# Patient Record
Sex: FEMALE | Race: WHITE | NOT HISPANIC OR LATINO | URBAN - METROPOLITAN AREA
[De-identification: names, ages, dates, MRNs, and addresses within clinical notes are randomized per-mention and may not be internally consistent; named-entity substitution may affect disease eponyms.]

---

## 2018-07-31 ENCOUNTER — OUTPATIENT (OUTPATIENT)
Dept: OUTPATIENT SERVICES | Facility: HOSPITAL | Age: 23
LOS: 1 days | End: 2018-07-31
Payer: COMMERCIAL

## 2018-07-31 VITALS
RESPIRATION RATE: 18 BRPM | OXYGEN SATURATION: 98 % | WEIGHT: 145.95 LBS | HEIGHT: 59 IN | SYSTOLIC BLOOD PRESSURE: 138 MMHG | DIASTOLIC BLOOD PRESSURE: 70 MMHG | TEMPERATURE: 98 F | HEART RATE: 70 BPM

## 2018-07-31 DIAGNOSIS — K64.9 UNSPECIFIED HEMORRHOIDS: ICD-10-CM

## 2018-07-31 DIAGNOSIS — Z01.818 ENCOUNTER FOR OTHER PREPROCEDURAL EXAMINATION: ICD-10-CM

## 2018-07-31 DIAGNOSIS — Z96.22 MYRINGOTOMY TUBE(S) STATUS: Chronic | ICD-10-CM

## 2018-07-31 DIAGNOSIS — K64.8 OTHER HEMORRHOIDS: ICD-10-CM

## 2018-07-31 LAB
HCT VFR BLD CALC: 41.5 % — SIGNIFICANT CHANGE UP (ref 34.5–45)
HGB BLD-MCNC: 14.2 G/DL — SIGNIFICANT CHANGE UP (ref 11.5–15.5)
MCHC RBC-ENTMCNC: 29.8 PG — SIGNIFICANT CHANGE UP (ref 27–34)
MCHC RBC-ENTMCNC: 34.2 GM/DL — SIGNIFICANT CHANGE UP (ref 32–36)
MCV RBC AUTO: 87.2 FL — SIGNIFICANT CHANGE UP (ref 80–100)
PLATELET # BLD AUTO: 233 K/UL — SIGNIFICANT CHANGE UP (ref 150–400)
RBC # BLD: 4.76 M/UL — SIGNIFICANT CHANGE UP (ref 3.8–5.2)
RBC # FLD: 12.4 % — SIGNIFICANT CHANGE UP (ref 10.3–14.5)
WBC # BLD: 7.18 K/UL — SIGNIFICANT CHANGE UP (ref 3.8–10.5)
WBC # FLD AUTO: 7.18 K/UL — SIGNIFICANT CHANGE UP (ref 3.8–10.5)

## 2018-07-31 PROCEDURE — 87389 HIV-1 AG W/HIV-1&-2 AB AG IA: CPT

## 2018-07-31 PROCEDURE — G0463: CPT

## 2018-07-31 PROCEDURE — 85027 COMPLETE CBC AUTOMATED: CPT

## 2018-07-31 RX ORDER — SODIUM CHLORIDE 9 MG/ML
3 INJECTION INTRAMUSCULAR; INTRAVENOUS; SUBCUTANEOUS EVERY 8 HOURS
Qty: 0 | Refills: 0 | Status: DISCONTINUED | OUTPATIENT
Start: 2018-08-06 | End: 2018-08-21

## 2018-07-31 RX ORDER — LIDOCAINE HCL 20 MG/ML
0.2 VIAL (ML) INJECTION ONCE
Qty: 0 | Refills: 0 | Status: DISCONTINUED | OUTPATIENT
Start: 2018-08-06 | End: 2018-08-21

## 2018-07-31 NOTE — H&P PST ADULT - PMH
PCOS (polycystic ovarian syndrome) Anemia  iron infusion March 2018  Asthma, exercise induced  last inhaler use approximately 3 years ago  Hemorrhoids  s/p banding procedure; unsuccessful  PCOS (polycystic ovarian syndrome)

## 2018-07-31 NOTE — H&P PST ADULT - NSANTHOSAYNRD_GEN_A_CORE
No. ABDULAZIZ screening performed.  STOP BANG Legend: 0-2 = LOW Risk; 3-4 = INTERMEDIATE Risk; 5-8 = HIGH Risk

## 2018-07-31 NOTE — H&P PST ADULT - NEUROLOGICAL DETAILS
alert and oriented x 3/normal strength/responds to pain/sensation intact/responds to verbal commands

## 2018-07-31 NOTE — H&P PST ADULT - HISTORY OF PRESENT ILLNESS
23 year old female reports a history of intermittent hemorrhoids X 7 years. Patient states she experiences rectal bleeding approximately 4X weekly and has required iron infusions in the past due to anemia.

## 2018-08-01 LAB — HIV 1+2 AB+HIV1 P24 AG SERPL QL IA: SIGNIFICANT CHANGE UP

## 2018-08-06 ENCOUNTER — OUTPATIENT (OUTPATIENT)
Dept: OUTPATIENT SERVICES | Facility: HOSPITAL | Age: 23
LOS: 1 days | End: 2018-08-06
Payer: COMMERCIAL

## 2018-08-06 VITALS
HEART RATE: 67 BPM | DIASTOLIC BLOOD PRESSURE: 79 MMHG | TEMPERATURE: 98 F | OXYGEN SATURATION: 99 % | HEIGHT: 59 IN | SYSTOLIC BLOOD PRESSURE: 114 MMHG | RESPIRATION RATE: 16 BRPM | WEIGHT: 145.95 LBS

## 2018-08-06 VITALS
DIASTOLIC BLOOD PRESSURE: 78 MMHG | RESPIRATION RATE: 16 BRPM | SYSTOLIC BLOOD PRESSURE: 122 MMHG | OXYGEN SATURATION: 99 % | HEART RATE: 70 BPM | TEMPERATURE: 97 F

## 2018-08-06 DIAGNOSIS — Z01.818 ENCOUNTER FOR OTHER PREPROCEDURAL EXAMINATION: ICD-10-CM

## 2018-08-06 DIAGNOSIS — Z96.22 MYRINGOTOMY TUBE(S) STATUS: Chronic | ICD-10-CM

## 2018-08-06 DIAGNOSIS — K64.8 OTHER HEMORRHOIDS: ICD-10-CM

## 2018-08-06 PROCEDURE — 88304 TISSUE EXAM BY PATHOLOGIST: CPT

## 2018-08-06 PROCEDURE — 46260 REMOVE IN/EX HEM GROUPS 2+: CPT

## 2018-08-06 PROCEDURE — 88304 TISSUE EXAM BY PATHOLOGIST: CPT | Mod: 26

## 2018-08-06 RX ORDER — METFORMIN HYDROCHLORIDE 850 MG/1
1 TABLET ORAL
Qty: 0 | Refills: 0 | COMMUNITY

## 2018-08-06 RX ORDER — ONDANSETRON 8 MG/1
4 TABLET, FILM COATED ORAL ONCE
Qty: 0 | Refills: 0 | Status: DISCONTINUED | OUTPATIENT
Start: 2018-08-06 | End: 2018-08-21

## 2018-08-06 RX ORDER — IBUPROFEN 200 MG
1 TABLET ORAL
Qty: 40 | Refills: 0 | OUTPATIENT
Start: 2018-08-06

## 2018-08-06 RX ORDER — CELECOXIB 200 MG/1
200 CAPSULE ORAL ONCE
Qty: 0 | Refills: 0 | Status: COMPLETED | OUTPATIENT
Start: 2018-08-06 | End: 2018-08-06

## 2018-08-06 RX ORDER — CELECOXIB 200 MG/1
200 CAPSULE ORAL ONCE
Qty: 0 | Refills: 0 | Status: DISCONTINUED | OUTPATIENT
Start: 2018-08-06 | End: 2018-08-21

## 2018-08-06 RX ORDER — ACETAMINOPHEN 500 MG
1000 TABLET ORAL ONCE
Qty: 0 | Refills: 0 | Status: COMPLETED | OUTPATIENT
Start: 2018-08-06 | End: 2018-08-06

## 2018-08-06 RX ORDER — ACETAMINOPHEN WITH CODEINE 300MG-30MG
1 TABLET ORAL
Qty: 28 | Refills: 0 | OUTPATIENT
Start: 2018-08-06 | End: 2018-08-12

## 2018-08-06 RX ORDER — OXYCODONE HYDROCHLORIDE 5 MG/1
5 TABLET ORAL ONCE
Qty: 0 | Refills: 0 | Status: DISCONTINUED | OUTPATIENT
Start: 2018-08-06 | End: 2018-08-06

## 2018-08-06 RX ORDER — SODIUM CHLORIDE 9 MG/ML
1000 INJECTION, SOLUTION INTRAVENOUS
Qty: 0 | Refills: 0 | Status: DISCONTINUED | OUTPATIENT
Start: 2018-08-06 | End: 2018-08-21

## 2018-08-06 RX ADMIN — Medication 1000 MILLIGRAM(S): at 06:18

## 2018-08-06 RX ADMIN — CELECOXIB 200 MILLIGRAM(S): 200 CAPSULE ORAL at 06:18

## 2018-08-06 NOTE — ASU DISCHARGE PLAN (ADULT/PEDIATRIC). - NOTIFY
Numbness, color, or temperature change to extremity/Bleeding that does not stop/Excessive Diarrhea/Swelling that continues/Inability to Tolerate Liquids or Foods/Unable to Urinate/Increased Irritability or Sluggishness/Fever greater than 101

## 2018-08-06 NOTE — ASU DISCHARGE PLAN (ADULT/PEDIATRIC). - MEDICATION SUMMARY - MEDICATIONS TO TAKE
I will START or STAY ON the medications listed below when I get home from the hospital:    ibuprofen 800 mg oral tablet  -- 1 tab(s) by mouth every 8 hours, As Needed -for moderate pain MDD:3 tablets  -- Do not take this drug if you are pregnant.  It is very important that you take or use this exactly as directed.  Do not skip doses or discontinue unless directed by your doctor.  May cause drowsiness or dizziness.  Obtain medical advice before taking any non-prescription drugs as some may affect the action of this medication.  Take with food or milk.    -- Indication: For Other hemorrhoids    acetaminophen-codeine 300 mg-30 mg oral tablet  -- 1 tab(s) by mouth every 6 hours, As Needed -for severe pain MDD:4 tablets  -- Caution federal law prohibits the transfer of this drug to any person other  than the person for whom it was prescribed.  May cause drowsiness.  Alcohol may intensify this effect.  Use care when operating dangerous machinery.  This product contains acetaminophen.  Do not use  with any other product containing acetaminophen to prevent possible liver damage.  Using more of this medication than prescribed may cause serious breathing problems.    -- Indication: For Other hemorrhoids    metFORMIN 500 mg oral tablet  -- 1 tab(s) by mouth 2 times a day  -- Indication: For Home med

## 2018-08-06 NOTE — BRIEF OPERATIVE NOTE - POST-OP DX
External hemorrhoids  08/06/2018    Active  Kermit Ramon  Grade III hemorrhoids  08/06/2018    Active  Kermit Ramon

## 2018-08-06 NOTE — ASU PATIENT PROFILE, ADULT - PMH
Anemia  iron infusion March 2018  Asthma, exercise induced  last inhaler use approximately 3 years ago  Hemorrhoids  s/p banding procedure; unsuccessful  PCOS (polycystic ovarian syndrome)

## 2018-08-09 LAB — SURGICAL PATHOLOGY STUDY: SIGNIFICANT CHANGE UP

## 2021-03-18 NOTE — ASU PREOP CHECKLIST - NS PREOP CHK HIBICLENS NA
Yamel is a 73 year old who is being evaluated via a billable telephone visit.      What phone number would you like to be contacted at? [phone]  How would you like to obtain your AVS? Mail a copy    Assessment & Plan     Muscle spasm  - tiZANidine (ZANAFLEX) 2 MG tablet; Take 1 tablet (2 mg) by mouth 3 times daily as needed for muscle spasms      FUTURE APPOINTMENTS:       - Follow-up visit in one month or sooner as needed.    Return in about 4 weeks (around 4/15/2021) for Follow up.    Catie Fraser MD  [facility]    Subjective   Yamel is a 73 year old who presents for the following health issues     HPI   73 yr old female with generalized pain- uses Tizanidine as needed. Patient warned of sedating effect. She is already on a lot of potential sedating mediations. She reports no side effects.   Medication Followup of Discuss use of tizanadine--taking for Arthritic pain and  Tension in neck and back    Taking Medication as prescribed: NO, was told to call before further refills    Side Effects:  None    Medication Helping Symptoms:  yes         Review of Systems   Constitutional, HEENT, cardiovascular, pulmonary, gi and gu systems are negative, except as otherwise noted.      Objective           Vitals:  No vitals were obtained today due to virtual visit.    Physical Exam   healthy, alert and no distress  PSYCH: Alert and oriented times 3; coherent speech, normal   rate and volume, able to articulate logical thoughts, able   to abstract reason, no tangential thoughts, no hallucinations   or delusions  Her affect is normal  RESP: No cough, no audible wheezing, able to talk in full sentences  Remainder of exam unable to be completed due to telephone visits                Phone call duration: 3 minutes   N/A

## 2021-04-20 NOTE — H&P PST ADULT - GASTROINTESTINAL
[FreeTextEntry1] : NAD\par A&Ox3\par Non-obese\par No significant change in today's examination\par Inspection: decreased lumbar lordosis\par ROM L-spine: 1/2 full forward flexion; 10' passive extension\par ROM Hips: smooth IR/ER w/o pain\par Pelvic tilt: slight\par Seated slump test: neg\par SLR: neg\par SHAUNA's: neg\par DTR's: symmetric knees; absent ankles\par MMT: 5/5 b/l LE\par Sensation: SILT\par Toe & Heel Walk: Yes\par Palpation: no TPs\par  details… detailed exam

## 2022-11-04 NOTE — ASU PATIENT PROFILE, ADULT - IS PATIENT PREGNANT?
[FreeTextEntry1] : Hypertension, hypothyroid, and lung nodule [de-identified] : no complaints\par  ucg negative/no

## 2025-04-03 ENCOUNTER — OFFICE (OUTPATIENT)
Facility: LOCATION | Age: 30
Setting detail: OPHTHALMOLOGY
End: 2025-04-03
Payer: COMMERCIAL

## 2025-04-03 DIAGNOSIS — H52.13: ICD-10-CM

## 2025-04-03 PROCEDURE — SCREF LASIK EVAL: Performed by: OPHTHALMOLOGY

## 2025-04-03 ASSESSMENT — REFRACTION_CURRENTRX
OS_VPRISM_DIRECTION: SV
OS_CYLINDER: SPHERE
OS_OVR_VA: 20/
OD_OVR_VA: 20/
OD_VPRISM_DIRECTION: SV
OD_CYLINDER: SPHERE
OD_SPHERE: -6.25
OS_SPHERE: -6.00

## 2025-04-03 ASSESSMENT — KERATOMETRY
OD_AXISANGLE_DEGREES: 089
OS_AXISANGLE_DEGREES: 079
OS_K1POWER_DIOPTERS: 43.75
OD_K1POWER_DIOPTERS: 43.75
OD_K2POWER_DIOPTERS: 44.00
OS_K2POWER_DIOPTERS: 44.50

## 2025-04-03 ASSESSMENT — CONFRONTATIONAL VISUAL FIELD TEST (CVF)
OD_FINDINGS: FULL
OS_FINDINGS: FULL

## 2025-04-03 ASSESSMENT — REFRACTION_AUTOREFRACTION
OD_SPHERE: -5.25
OD_AXIS: 105
OS_SPHERE: -5.25
OD_CYLINDER: -0.50
OS_CYLINDER: SPHERE

## 2025-04-03 ASSESSMENT — VISUAL ACUITY
OS_BCVA: 20/20-
OD_BCVA: 20/20

## 2025-04-10 ENCOUNTER — OFFICE (OUTPATIENT)
Facility: LOCATION | Age: 30
Setting detail: OPHTHALMOLOGY
End: 2025-04-10
Payer: COMMERCIAL

## 2025-04-10 DIAGNOSIS — H52.13: ICD-10-CM

## 2025-04-10 PROCEDURE — SCREF LASIK EVAL: Performed by: OPHTHALMOLOGY

## 2025-04-10 ASSESSMENT — REFRACTION_MANIFEST
OS_VA1: 20/15
OS_SPHERE: -6.25
OS_CYLINDER: SPH
OD_SPHERE: -6.25
OD_VA1: 20/15
OD_AXIS: 090
OD_CYLINDER: -0.50

## 2025-04-10 ASSESSMENT — REFRACTION_CURRENTRX
OS_SPHERE: -6.00
OS_VPRISM_DIRECTION: SV
OD_OVR_VA: 20/
OD_CYLINDER: SPHERE
OD_SPHERE: -6.25
OS_CYLINDER: SPHERE
OS_OVR_VA: 20/
OD_VPRISM_DIRECTION: SV

## 2025-04-10 ASSESSMENT — KERATOMETRY
OS_K1K2_AVERAGE: 44.125
OD_K2POWER_DIOPTERS: 44.00
OS_K1POWER_DIOPTERS: 43.75
OS_K2POWER_DIOPTERS: 44.50
OD_K1POWER_DIOPTERS: 43.75
OS_AXISANGLE_DEGREES: 165
OS_AXISANGLE_DEGREES: 075
OS_AXISANGLE2_DEGREES: 075
OS_CYLPOWER_DEGREES: 0.75
OD_K1POWER_DIOPTERS: 43.75
OD_AXISANGLE2_DEGREES: 094
OS_K2POWER_DIOPTERS: 44.50
OS_K1POWER_DIOPTERS: 43.75
OD_AXISANGLE_DEGREES: 094
OD_AXISANGLE_DEGREES: 4
OD_CYLAXISANGLE_DEGREES: 094
OD_CYLPOWER_DEGREES: 0.25
OD_K1K2_AVERAGE: 43.875
OD_K2POWER_DIOPTERS: 44.00
OS_CYLAXISANGLE_DEGREES: 075

## 2025-04-10 ASSESSMENT — VISUAL ACUITY
OS_BCVA: 20/20-
OD_BCVA: 20/20

## 2025-04-10 ASSESSMENT — REFRACTION_AUTOREFRACTION
OS_AXIS: 149
OD_AXIS: 088
OD_CYLINDER: -0.75
OS_CYLINDER: -0.25
OS_SPHERE: -6.00
OD_SPHERE: -5.75

## 2025-04-10 ASSESSMENT — CONFRONTATIONAL VISUAL FIELD TEST (CVF)
OD_FINDINGS: FULL
OS_FINDINGS: FULL

## 2025-04-10 ASSESSMENT — LID EXAM ASSESSMENTS
OD_BLEPHARITIS: ABSENT
OS_BLEPHARITIS: ABSENT

## 2025-04-18 ENCOUNTER — OTHER LOCATION (OUTPATIENT)
Dept: URBAN - METROPOLITAN AREA LASIK CENTER 3 | Facility: LASIK CENTER | Age: 30
Setting detail: OPHTHALMOLOGY
End: 2025-04-18

## 2025-04-18 DIAGNOSIS — H52.13: ICD-10-CM

## 2025-04-18 PROCEDURE — 65760 KERATOMILEUSIS: CPT | Mod: GY,RT,LT | Performed by: OPHTHALMOLOGY

## 2025-04-18 ASSESSMENT — REFRACTION_AUTOREFRACTION
OD_SPHERE: -5.75
OD_AXIS: 088
OS_CYLINDER: -0.25
OS_SPHERE: -6.00
OD_CYLINDER: -0.75
OS_AXIS: 149

## 2025-04-18 ASSESSMENT — LID EXAM ASSESSMENTS
OD_BLEPHARITIS: ABSENT
OS_BLEPHARITIS: ABSENT

## 2025-04-18 ASSESSMENT — REFRACTION_MANIFEST
OS_CYLINDER: SPH
OS_VA1: 20/15
OD_VA1: 20/15
OD_SPHERE: -6.25
OS_SPHERE: -6.25
OD_CYLINDER: -0.50
OD_AXIS: 090

## 2025-04-18 ASSESSMENT — REFRACTION_CURRENTRX
OD_CYLINDER: SPHERE
OS_VPRISM_DIRECTION: SV
OS_SPHERE: -6.00
OD_VPRISM_DIRECTION: SV
OD_OVR_VA: 20/
OS_CYLINDER: SPHERE
OD_SPHERE: -6.25
OS_OVR_VA: 20/

## 2025-04-18 ASSESSMENT — KERATOMETRY
OD_K1POWER_DIOPTERS: 43.75
OS_AXISANGLE_DEGREES: 075
OS_K2POWER_DIOPTERS: 44.50
OS_K1POWER_DIOPTERS: 43.75
OD_K2POWER_DIOPTERS: 44.00
OD_AXISANGLE_DEGREES: 094

## 2025-04-18 ASSESSMENT — VISUAL ACUITY
OS_BCVA: 20/20-
OD_BCVA: 20/20

## 2025-04-19 ENCOUNTER — OFFICE (OUTPATIENT)
Facility: LOCATION | Age: 30
Setting detail: OPHTHALMOLOGY
End: 2025-04-19
Payer: COMMERCIAL

## 2025-04-19 DIAGNOSIS — H52.13: ICD-10-CM

## 2025-04-19 PROCEDURE — 99024 POSTOP FOLLOW-UP VISIT: CPT | Performed by: OPHTHALMOLOGY

## 2025-04-19 ASSESSMENT — KERATOMETRY
OD_K1POWER_DIOPTERS: 43.75
OS_K2POWER_DIOPTERS: 44.50
OD_AXISANGLE_DEGREES: 094
OS_AXISANGLE_DEGREES: 075
OD_K2POWER_DIOPTERS: 44.00
OS_K1POWER_DIOPTERS: 43.75

## 2025-04-19 ASSESSMENT — REFRACTION_CURRENTRX
OD_CYLINDER: SPHERE
OD_SPHERE: -6.25
OS_VPRISM_DIRECTION: SV
OS_CYLINDER: SPHERE
OS_OVR_VA: 20/
OS_SPHERE: -6.00
OD_OVR_VA: 20/
OD_VPRISM_DIRECTION: SV

## 2025-04-19 ASSESSMENT — REFRACTION_MANIFEST
OD_CYLINDER: -0.50
OS_VA1: 20/15
OD_VA1: 20/15
OS_CYLINDER: SPH
OS_SPHERE: -6.25
OD_AXIS: 090
OD_SPHERE: -6.25

## 2025-04-19 ASSESSMENT — CONFRONTATIONAL VISUAL FIELD TEST (CVF)
OS_FINDINGS: FULL
OD_FINDINGS: FULL

## 2025-04-19 ASSESSMENT — REFRACTION_AUTOREFRACTION
OS_SPHERE: -6.00
OD_SPHERE: -5.75
OD_AXIS: 088
OS_CYLINDER: -0.25
OS_AXIS: 149
OD_CYLINDER: -0.75

## 2025-04-19 ASSESSMENT — VISUAL ACUITY
OD_BCVA: 20/20-1
OS_BCVA: 20/20-1

## 2025-05-08 ENCOUNTER — RX ONLY (RX ONLY)
Age: 30
End: 2025-05-08

## 2025-05-08 ENCOUNTER — OFFICE (OUTPATIENT)
Facility: LOCATION | Age: 30
Setting detail: OPHTHALMOLOGY
End: 2025-05-08
Payer: COMMERCIAL

## 2025-05-08 DIAGNOSIS — H52.13: ICD-10-CM

## 2025-05-08 PROCEDURE — 99024 POSTOP FOLLOW-UP VISIT: CPT | Performed by: OPHTHALMOLOGY

## 2025-05-08 ASSESSMENT — REFRACTION_CURRENTRX
OS_OVR_VA: 20/
OS_SPHERE: -6.00
OS_VPRISM_DIRECTION: SV
OD_SPHERE: -6.25
OD_CYLINDER: SPHERE
OS_CYLINDER: SPHERE
OD_OVR_VA: 20/
OD_VPRISM_DIRECTION: SV

## 2025-05-08 ASSESSMENT — KERATOMETRY
OD_AXISANGLE_DEGREES: 080
OS_K1POWER_DIOPTERS: 38.50
OD_K1POWER_DIOPTERS: 38.50
OS_AXISANGLE_DEGREES: 079
OD_K2POWER_DIOPTERS: 39.25
OS_K2POWER_DIOPTERS: 39.25

## 2025-05-08 ASSESSMENT — REFRACTION_MANIFEST
OD_AXIS: 090
OD_SPHERE: -6.25
OS_SPHERE: -6.25
OD_VA1: 20/15
OD_CYLINDER: -0.50
OS_VA1: 20/15
OS_CYLINDER: SPH

## 2025-05-08 ASSESSMENT — CONFRONTATIONAL VISUAL FIELD TEST (CVF)
OS_FINDINGS: FULL
OD_FINDINGS: FULL

## 2025-05-08 ASSESSMENT — REFRACTION_AUTOREFRACTION
OS_AXIS: 180
OD_SPHERE: +0.50
OS_CYLINDER: -0.50
OD_AXIS: 179
OD_CYLINDER: -0.75
OS_SPHERE: +1.25

## 2025-05-08 ASSESSMENT — VISUAL ACUITY
OD_BCVA: 20/20
OS_BCVA: 20/20-1